# Patient Record
Sex: FEMALE | Race: ASIAN
[De-identification: names, ages, dates, MRNs, and addresses within clinical notes are randomized per-mention and may not be internally consistent; named-entity substitution may affect disease eponyms.]

---

## 2018-05-09 ENCOUNTER — HOSPITAL ENCOUNTER (OUTPATIENT)
Dept: HOSPITAL 76 - DI | Age: 63
Discharge: HOME | End: 2018-05-09
Attending: FAMILY MEDICINE
Payer: COMMERCIAL

## 2018-05-09 DIAGNOSIS — Z13.820: Primary | ICD-10-CM

## 2018-05-09 DIAGNOSIS — Z78.0: ICD-10-CM

## 2018-05-09 PROCEDURE — 77080 DXA BONE DENSITY AXIAL: CPT

## 2018-05-13 NOTE — DEXA REPORT
DEXA SCAN:  05/09/2018

 

CLINICAL INDICATION:  Postmenopausal.

 

TECHNIQUE:  Dual energy x-ray absorptiometry (DXA) was performed on a  [employer]
  system.  

Regions measured are the AP spine, femoral neck, and, if needed, forearm.

 

COMPARISON:  None.  In accordance with the International Society for Clinical 
Densitometry 

(ISCD) guidelines, data from previous exams may be reanalyzed using current 
recommendations and

techniques.  This is done to allow a more accurate basis for comparison with 
the current study.

 

FINDINGS

The data for the lumbar spine is as follows:





REGION BMD (g/cm/cm) T-SCORE Z-SCORE

 

L1 0.935 -1.6 0.0

 

L2 0.998 -1.7 0.0

 

L3 1.097 -0.9 0.8

 

L4 1.179 -0.2 1.5

 

L1-L4 1.065 -1.0 0.7

 

   





NOTE:  All evaluable vertebrae are used for classification.

 

 

The data for the hip is as follows:





REGION BMD (g/cm/cm) T-SCORE Z-SCORE

 

Neck 0.970 -0.5 1.0

 

TOTAL 0.950 -0.5 0.8





NOTE:  The femoral neck or total proximal femur, whichever is lowest, is used 
for classification.

 

 

IMPRESSION

WHO CLASSIFICATION BASED ON THE INTERNATIONAL REFERENCE STANDARD IS NORMAL.

FRACTURE RISK IS NOT INCREASED.

 

RECOMMENDATION:  Patients with diagnosis of osteoporosis or osteopenia should 
have regular bone

mineral density assessment. For those eligible for Medicare, routine testing is 
allowed once every 2 years. 

Testing frequency can be increased for patients who have rapidly progressing 
disease or for those who are 

receiving medical therapy to restore bone mass. 



COMMENT

World Health Organization (WHO) definitions for osteoporosis and osteopenia:

NORMAL BMD:  T-score at 1.0 or higher, fracture risk is low.

OSTEOPENIA BMD:  T-score between 1.0 and -2.5, fracture risk is increased.

OSTEOPOROSIS BMD:  T-score at 2.5 or lower, fracture risk high.

 

National Osteoporosis Foundation recommends:

1. Obtain adequate dietary calcium (at least 1200 mg per day) and vitamin D (400
-800 international units per day).

2. Participate, as appropriate, in regular weightbearing and muscle-
strengthening exercise.

3. Avoid tobacco use and reduce alcohol and caffeine intake.

4. For more detailed information see the website at www.NOF.org.

 

 

DD: 05/09/2018 14:00

TD: 05/09/2018 14:15

Job #: [id number]

[email or web address]